# Patient Record
(demographics unavailable — no encounter records)

---

## 2017-02-19 DIAGNOSIS — Z00.00 ROUTINE GENERAL MEDICAL EXAMINATION AT A HEALTH CARE FACILITY: ICD-10-CM

## 2017-02-19 NOTE — TELEPHONE ENCOUNTER
Patient needs a refill of the following  Requested Prescriptions     Pending Prescriptions Disp Refills    albuterol (PROVENTIL HFA, VENTOLIN HFA, PROAIR HFA) 90 mcg/actuation inhaler 1 Inhaler 0     Sig: Take 1 Puff by inhalation every four (4) hours as needed for Wheezing.

## 2017-02-21 NOTE — TELEPHONE ENCOUNTER
Call rec'd from [address] [place] [address] that patient is requesting a refill. Returned call to the patient to call the office. Left voice mail.     Want to double check medication request.

## 2017-02-24 RX ORDER — ALBUTEROL SULFATE 90 UG/1
1 AEROSOL, METERED RESPIRATORY (INHALATION)
Qty: 1 INHALER | Refills: 0 | Status: SHIPPED | OUTPATIENT
Start: 2017-02-24